# Patient Record
Sex: FEMALE | Race: WHITE | NOT HISPANIC OR LATINO | Employment: OTHER | URBAN - NONMETROPOLITAN AREA
[De-identification: names, ages, dates, MRNs, and addresses within clinical notes are randomized per-mention and may not be internally consistent; named-entity substitution may affect disease eponyms.]

---

## 2019-01-10 ENCOUNTER — TELEPHONE (OUTPATIENT)
Dept: ADMINISTRATIVE | Facility: HOSPITAL | Age: 59
End: 2019-01-10

## 2019-01-10 NOTE — TELEPHONE ENCOUNTER
Received CT vascular referral from Dr. Ayala's office on 1-9-18.  Contacted Dr. Ayala's office at [phone] spoke with nurse advised patient was scheduled with Dr. Low on 12/7/2018 and cancelled appt stating she did not have a ride to [place] and did not wish to reschedule appointment.  Nurse advised she would notify Dr. Ayala on cancellation.

## 2019-02-11 ENCOUNTER — OFFICE VISIT (OUTPATIENT)
Dept: CARDIAC SURGERY | Facility: CLINIC | Age: 59
End: 2019-02-11

## 2019-02-11 VITALS
DIASTOLIC BLOOD PRESSURE: 82 MMHG | SYSTOLIC BLOOD PRESSURE: 130 MMHG | OXYGEN SATURATION: 97 % | HEIGHT: 66 IN | WEIGHT: 176.4 LBS | BODY MASS INDEX: 28.35 KG/M2 | HEART RATE: 70 BPM

## 2019-02-11 DIAGNOSIS — F31.9 BIPOLAR DEPRESSION (HCC): ICD-10-CM

## 2019-02-11 DIAGNOSIS — I10 BENIGN ESSENTIAL HTN: ICD-10-CM

## 2019-02-11 DIAGNOSIS — I65.02 STENOSIS OF LEFT VERTEBRAL ARTERY: Primary | ICD-10-CM

## 2019-02-11 DIAGNOSIS — K21.9 GASTROESOPHAGEAL REFLUX DISEASE WITHOUT ESOPHAGITIS: ICD-10-CM

## 2019-02-11 DIAGNOSIS — F14.10 COCAINE ABUSE (HCC): ICD-10-CM

## 2019-02-11 PROCEDURE — 99204 OFFICE O/P NEW MOD 45 MIN: CPT | Performed by: THORACIC SURGERY (CARDIOTHORACIC VASCULAR SURGERY)

## 2019-02-11 RX ORDER — LAMOTRIGINE 100 MG/1
100 TABLET ORAL DAILY
COMMUNITY

## 2019-02-11 RX ORDER — LABETALOL 200 MG/1
200 TABLET, FILM COATED ORAL DAILY
COMMUNITY

## 2019-02-11 RX ORDER — ZOLPIDEM TARTRATE 10 MG/1
10 TABLET ORAL NIGHTLY PRN
COMMUNITY

## 2019-02-11 RX ORDER — OMEPRAZOLE 20 MG/1
20 CAPSULE, DELAYED RELEASE ORAL DAILY
COMMUNITY

## 2019-02-11 RX ORDER — TRAZODONE HYDROCHLORIDE 50 MG/1
50 TABLET ORAL NIGHTLY
COMMUNITY

## 2019-02-11 RX ORDER — LURASIDONE HYDROCHLORIDE 60 MG/1
60 TABLET, FILM COATED ORAL DAILY
COMMUNITY

## 2019-02-12 PROBLEM — F31.9 BIPOLAR DEPRESSION (HCC): Status: ACTIVE | Noted: 2019-02-12

## 2019-02-12 PROBLEM — K21.9 GASTROESOPHAGEAL REFLUX DISEASE WITHOUT ESOPHAGITIS: Status: ACTIVE | Noted: 2019-02-12

## 2019-02-12 PROBLEM — I65.02 STENOSIS OF LEFT VERTEBRAL ARTERY: Status: ACTIVE | Noted: 2019-02-12

## 2019-02-12 PROBLEM — I10 BENIGN ESSENTIAL HTN: Status: ACTIVE | Noted: 2019-02-12

## 2019-02-12 PROBLEM — F14.10 COCAINE ABUSE (HCC): Status: ACTIVE | Noted: 2019-02-12

## 2019-02-12 RX ORDER — ASPIRIN 81 MG/1
81 TABLET ORAL DAILY
Qty: 150 TABLET | Refills: 2
Start: 2019-02-12 | End: 2020-02-12

## 2019-02-12 NOTE — PROGRESS NOTES
2019    Micaela Ramirez  1960    Chief Complaint:    Chief Complaint   Patient presents with   • vertebral artery stenosis       HPI:      PCP:  Dr Ayala    58 y.o. female with HTN(stable, increased risk stroke, rupture) , vertebral artery stenosis (new, increase risk cardiovascular events), IV drug use(stable, increase risk infection, cardiovascular events), Hepatitis B, C(stable).  former smoker.  Severe headache 10/2018 x 1 day.  Moderate occipital headaches off on.  Seizure in ER [facility].  aimee Dunreith.  Vertebral artery stenosis noted on imaging.  IV drug use.  No TIA stroke amaurosis.  No MI claudication. No other associated signs, symptoms or modifying factors.    10/2018 CT Brain([facility]):  No infarct or perfusion abnormality.  10/2018 CTA Neck ():  Tortuous ICA, vertebrals.  50% stenosis origin LEFT vertebral.  10/2018 MRI Brain ():  Old LEFT parietal infarct.  10/2018 CSpine Xray:  No fracture or dislocation  10/2018 LEFT leg Xray:  No fracture or dislocation    The following portions of the patient's history were reviewed and updated as appropriate: allergies, current medications, past family history, past medical history, past social history, past surgical history and problem list.  Recent images independently reviewed.  Available laboratory values reviewed.    PMH:  Past Medical History:   Diagnosis Date   • Bipolar depression (CMS/HCC)    • GERD (gastroesophageal reflux disease)    • Hepatitis B    • Hepatitis C    • HTN (hypertension)      Family History   Problem Relation Age of Onset   • Heart failure Mother    • Heart disease Father      Social History     Tobacco Use   • Smoking status: Former Smoker     Packs/day: 1.00     Years: 42.00     Pack years: 42.00     Types: Cigarettes     Last attempt to quit: 2015     Years since quittin.0   • Smokeless tobacco: Never Used   Substance Use Topics   • Alcohol use: No     Frequency: Never   • Drug use: Yes     Types: IV,  Cocaine       ALLERGIES:  Allergies   Allergen Reactions   • Codeine Hives         MEDICATIONS:    Current Outpatient Medications:   •  labetalol (NORMODYNE) 200 MG tablet, Take 200 mg by mouth Daily., Disp: , Rfl:   •  lamoTRIgine (LaMICtal) 100 MG tablet, Take 100 mg by mouth Daily., Disp: , Rfl:   •  lurasidone HCl (LATUDA) 60 MG tablet tablet, Take 60 mg by mouth Daily., Disp: , Rfl:   •  omeprazole (priLOSEC) 20 MG capsule, Take 20 mg by mouth Daily., Disp: , Rfl:   •  traZODone (DESYREL) 50 MG tablet, Take 50 mg by mouth Every Night., Disp: , Rfl:   •  zolpidem (AMBIEN) 10 MG tablet, Take 10 mg by mouth At Night As Needed for Sleep., Disp: , Rfl:   •  aspirin 81 MG EC tablet, Take 1 tablet by mouth Daily., Disp: 150 tablet, Rfl: 2    Review of Systems   Review of Systems   Constitution: Negative for malaise/fatigue, night sweats and weight loss.   HENT: Negative for hearing loss, hoarse voice and stridor.    Eyes: Negative for vision loss in left eye, vision loss in right eye and visual disturbance.   Cardiovascular: Negative for chest pain, leg swelling and palpitations.        Pain with walking   Respiratory: Negative for cough, hemoptysis and shortness of breath.    Hematologic/Lymphatic: Negative for adenopathy and bleeding problem. Does not bruise/bleed easily.   Skin: Negative for color change, poor wound healing and rash.   Musculoskeletal: Positive for arthritis, back pain and neck pain. Negative for muscle weakness.   Gastrointestinal: Positive for heartburn. Negative for abdominal pain and dysphagia.   Neurological: Positive for headaches and seizures. Negative for dizziness and numbness.   Psychiatric/Behavioral: Positive for depression. Negative for altered mental status and memory loss. The patient is nervous/anxious.        Physical Exam   Physical Exam   Constitutional: She is oriented to person, place, and time. She is active and cooperative. She does not appear ill. No distress.   HENT:    Head: Atraumatic.   Right Ear: Hearing normal.   Left Ear: Hearing normal.   Nose: No nasal deformity. No epistaxis.   Mouth/Throat: She does not have dentures. Normal dentition.   Eyes: Conjunctivae and lids are normal. Right pupil is round and reactive. Left pupil is round and reactive.   Neck: No JVD present. Carotid bruit is not present. No tracheal deviation present. No thyroid mass and no thyromegaly present.   Cardiovascular: Normal rate and regular rhythm.   No murmur heard.  Pulses:       Carotid pulses are 2+ on the right side, and 2+ on the left side.       Radial pulses are 2+ on the right side, and 2+ on the left side.        Dorsalis pedis pulses are 2+ on the right side, and 2+ on the left side.   Pulmonary/Chest: Effort normal and breath sounds normal.   Abdominal: Soft. She exhibits no distension and no mass. There is no splenomegaly or hepatomegaly. There is no tenderness.   Musculoskeletal: She exhibits no deformity.   Gait normal.    Lymphadenopathy:     She has no cervical adenopathy.        Right: No supraclavicular adenopathy present.        Left: No supraclavicular adenopathy present.   Neurological: She is alert and oriented to person, place, and time. She has normal strength.   Skin: Skin is warm and dry. No cyanosis or erythema. No pallor.   No venous staining   Psychiatric: She has a normal mood and affect. Her speech is normal. Judgment and thought content normal.     BUN 16 Creat 0.94  GFR 66    ASSESSMENT:  Micaela was seen today for vertebral artery stenosis.    Diagnoses and all orders for this visit:    Stenosis of left vertebral artery    Benign essential HTN    Gastroesophageal reflux disease without esophagitis    Bipolar depression (CMS/HCC)    Cocaine abuse (CMS/[facility])    Other orders  -     aspirin 81 MG EC tablet; Take 1 tablet by mouth Daily.    PLAN:  Detailed discussion with Micaela Ramirez regarding situation and options.  Moderate LEFT vertebral artery stenosis, asymptomatic.   Multiple risk factors with severe comorbidities.  No intervention indicated at this time.  Pt moving to [place], will establish medical care there.  Risks, benefits discussed.  Understands and wishes to proceed with plan.    Aspirin 81mg daily    Return as needed.  Obesity Class  0. Increased risk cardiovascular events, sleep and breathing disorders, joint issues, type 2 diabetes mellitus. Options for weight management, heart healthy diet, exercise programs, and associated health risks of obesity discussed.  Recommended regular physical activity, progressive walking program.  Continue current medications as directed.  Will Obtain relevant old records.    Thank you for the opportunity to participate in this patient's care.    Copy to primary care provider.    EMR Dragon/Transcription disclaimer:   Much of this encounter note is an electronic transcription/translation of spoken language to printed text. The electronic translation of spoken language may permit erroneous, or at times, nonsensical words or phrases to be inadvertently transcribed; Although I have reviewed the note for such errors, some may still exist.